# Patient Record
Sex: FEMALE | Race: OTHER | HISPANIC OR LATINO | URBAN - METROPOLITAN AREA
[De-identification: names, ages, dates, MRNs, and addresses within clinical notes are randomized per-mention and may not be internally consistent; named-entity substitution may affect disease eponyms.]

---

## 2017-05-28 ENCOUNTER — EMERGENCY (EMERGENCY)
Facility: HOSPITAL | Age: 42
LOS: 1 days | Discharge: HOME | End: 2017-05-28
Attending: EMERGENCY MEDICINE
Payer: COMMERCIAL

## 2017-05-28 VITALS
WEIGHT: 139.99 LBS | HEART RATE: 70 BPM | SYSTOLIC BLOOD PRESSURE: 108 MMHG | OXYGEN SATURATION: 100 % | DIASTOLIC BLOOD PRESSURE: 60 MMHG | TEMPERATURE: 98 F | RESPIRATION RATE: 20 BRPM

## 2017-05-28 LAB — HCG SERPL-ACNC: <2 MIU/ML — SIGNIFICANT CHANGE UP

## 2017-05-28 PROCEDURE — 99284 EMERGENCY DEPT VISIT MOD MDM: CPT

## 2017-05-28 PROCEDURE — 84702 CHORIONIC GONADOTROPIN TEST: CPT

## 2017-05-28 PROCEDURE — 96375 TX/PRO/DX INJ NEW DRUG ADDON: CPT

## 2017-05-28 PROCEDURE — 99284 EMERGENCY DEPT VISIT MOD MDM: CPT | Mod: 25

## 2017-05-28 PROCEDURE — 96374 THER/PROPH/DIAG INJ IV PUSH: CPT

## 2017-05-28 RX ORDER — METHOCARBAMOL 500 MG/1
2 TABLET, FILM COATED ORAL
Qty: 21 | Refills: 0
Start: 2017-05-28 | End: 2017-06-02

## 2017-05-28 RX ORDER — METHOCARBAMOL 500 MG/1
2 TABLET, FILM COATED ORAL
Qty: 21 | Refills: 0 | OUTPATIENT
Start: 2017-05-28 | End: 2017-06-02

## 2017-05-28 RX ORDER — SODIUM CHLORIDE 9 MG/ML
3 INJECTION INTRAMUSCULAR; INTRAVENOUS; SUBCUTANEOUS EVERY 8 HOURS
Qty: 0 | Refills: 0 | Status: DISCONTINUED | OUTPATIENT
Start: 2017-05-28 | End: 2017-06-01

## 2017-05-28 RX ORDER — METOCLOPRAMIDE HCL 10 MG
10 TABLET ORAL ONCE
Qty: 0 | Refills: 0 | Status: DISCONTINUED | OUTPATIENT
Start: 2017-05-28 | End: 2017-05-28

## 2017-05-28 RX ORDER — IBUPROFEN 200 MG
1 TABLET ORAL
Qty: 20 | Refills: 0 | OUTPATIENT
Start: 2017-05-28 | End: 2017-06-02

## 2017-05-28 RX ORDER — IBUPROFEN 200 MG
1 TABLET ORAL
Qty: 20 | Refills: 0
Start: 2017-05-28 | End: 2017-06-02

## 2017-05-28 RX ORDER — KETOROLAC TROMETHAMINE 30 MG/ML
30 SYRINGE (ML) INJECTION ONCE
Qty: 0 | Refills: 0 | Status: DISCONTINUED | OUTPATIENT
Start: 2017-05-28 | End: 2017-05-28

## 2017-05-28 RX ADMIN — Medication 30 MILLIGRAM(S): at 19:51

## 2017-05-28 RX ADMIN — SODIUM CHLORIDE 3 MILLILITER(S): 9 INJECTION INTRAMUSCULAR; INTRAVENOUS; SUBCUTANEOUS at 19:51

## 2017-05-28 RX ADMIN — Medication 30 MILLIGRAM(S): at 19:49

## 2017-05-28 NOTE — ED STATDOCS - PROGRESS NOTE DETAILS
HPI, ROS, PE done by intake doc. Orders/Plan reviewed. Pt presents today with neck stiffness x 2-3 days. Pt denies falls, injury, rash, fever, chills, nausea, vomiting, dizziness, confusion, headache (read and acknowledged triage note-Pt denies headache). She was sick about 1 month ago, but no recent uri symptoms. PE- Well developed, well-nourish, resting comfortably in NAD. EENT: no facial droop, no hemotympanum, uvula midline, tongue midline, PERRL, EOM b/l, Cardiac- +S1, S2, without murmurs, rubs, or gallops. Pulm- lungs CTA without wheezes, ronchi, or rales. Abdomen- BS normoactive. Soft, nontender to palpation. MS: ttp b/l paraspinal muscles, no midline tenderness, +spasm, b/l. Pt unable to move neck secondary to pain. FROM/strength/sensation of upper extremities. Neuro: intact without focal deficits, A&Ox3, no gross sensory or motor deficits. Plan: Will f/u plan per intake physician and re-eval Pt feeling a little better. Able to range cervical spine in all directions now, although limited. Will rx home with robaxin and motrin. Counseled on medication use. Given spine center f/u HPI, ROS, PE done by intake doc. Orders/Plan reviewed. Pt presents today with neck stiffness x 2-3 days. Admits to radicular symptoms to left arm. Pt states left neck pain > right neck pain. Pt denies falls, injury, rash, fever, chills, nausea, vomiting, dizziness, confusion, headache (read and acknowledged triage note-Pt denies headache). She was sick about 1 month ago, but no recent uri symptoms. Pt had similar episode in November 2016. PE- Well developed, well-nourish, resting comfortably in NAD. EENT: no facial droop, no hemotympanum, uvula midline, tongue midline, PERRL, EOM b/l, Cardiac- +S1, S2, without murmurs, rubs, or gallops. Pulm- lungs CTA without wheezes, ronchi, or rales. Abdomen- BS normoactive. Soft, nontender to palpation. MS: ttp b/l paraspinal muscles, no midline tenderness, +spasms to left traps and paraspinal muscles < right paraspinal muslces, b/l. Pt unable to move neck secondary to pain. FROM/strength/sensation of upper extremities. Neuro: intact without focal deficits, A&Ox3, no gross sensory or motor deficits. Plan: Will f/u plan per intake physician and re-eval HPI, ROS, PE done by intake doc. Orders/Plan reviewed. Pt presents today with neck stiffness x 2-3 days. Admits to radicular symptoms to left arm. Pt states left neck pain > right neck pain. Pt denies falls, injury, rash, fever, chills, nausea, vomiting, dizziness, confusion, headache (read and acknowledged triage note-Pt denies headache). She was sick about 1 month ago, but no recent uri symptoms. Pt had similar episode in November 2016. Pt denies chance of pregnancy, as she had a hysterectomy. PE- Well developed, well-nourish, resting comfortably in NAD. EENT: no facial droop, no hemotympanum, uvula midline, tongue midline, PERRL, EOM b/l, Cardiac- +S1, S2, without murmurs, rubs, or gallops. Pulm- lungs CTA without wheezes, ronchi, or rales. Abdomen- BS normoactive. Soft, nontender to palpation. MS: ttp b/l paraspinal muscles, no midline tenderness, +spasms to left traps and paraspinal muscles < right paraspinal muslces, b/l. Pt unable to move neck secondary to pain. FROM/strength/sensation of upper extremities. Neuro: intact without focal deficits, A&Ox3, no gross sensory or motor deficits. Plan: Will f/u plan per intake physician and re-eval

## 2017-05-28 NOTE — ED ADULT TRIAGE NOTE - CHIEF COMPLAINT QUOTE
lt side neck stiffness and body pain x 2 days.  Has migraine headache HX. took Motrin 600mg 9am. no fever now.

## 2017-05-28 NOTE — ED STATDOCS - NS ED MD SCRIBE ATTENDING SCRIBE SECTIONS
VITAL SIGNS( Pullset)/HIV/DISPOSITION/INTAKE ASSESSMENT/SCREENINGS/PAST MEDICAL/SURGICAL/SOCIAL HISTORY/PHYSICAL EXAM/REVIEW OF SYSTEMS/HISTORY OF PRESENT ILLNESS

## 2017-05-28 NOTE — ED STATDOCS - OBJECTIVE STATEMENT
40 y/o female presents to ED c/o neck pain and stiffness x 2 days. Unable to move her neck secondary to stiffness. No obvious trauma. Denies smoking or EOTH use. Pt had a similar episode about 1 month ago; previously when she would move her neck it would crack but this time pt cannot move neck at all. When she moves left or right she feels like her arm goes numb. Denies fever, chills, N/V/D, weakness, cough, hematuria. NKDA.

## 2017-05-28 NOTE — ED STATDOCS - ATTENDING CONTRIBUTION TO CARE
I, Benjamin Zhu, performed the initial face to face bedside interview with this patient regarding history of present illness, review of symptoms and relevant past medical, social and family history.  I completed an independent physical examination.  I was the initial provider who evaluated this patient. I have signed out the follow up of any pending tests (i.e. labs, radiological studies) to the ACP.  I have communicated the patient’s plan of care and disposition with the ACP.  The history, relevant review of systems, past medical and surgical history, medical decision making, and physical examination was documented by the scribe in my presence and I attest to the accuracy of the documentation.

## 2018-01-19 ENCOUNTER — EMERGENCY (EMERGENCY)
Facility: HOSPITAL | Age: 43
LOS: 1 days | Discharge: HOME | End: 2018-01-19
Attending: EMERGENCY MEDICINE
Payer: COMMERCIAL

## 2018-01-19 VITALS
TEMPERATURE: 98 F | HEIGHT: 65 IN | DIASTOLIC BLOOD PRESSURE: 69 MMHG | SYSTOLIC BLOOD PRESSURE: 105 MMHG | HEART RATE: 79 BPM | OXYGEN SATURATION: 99 % | RESPIRATION RATE: 20 BRPM | WEIGHT: 179.02 LBS

## 2018-01-19 PROCEDURE — 99284 EMERGENCY DEPT VISIT MOD MDM: CPT
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
## 2018-01-20 PROCEDURE — 99284 EMERGENCY DEPT VISIT MOD MDM: CPT | Mod: 25

## 2018-01-20 PROCEDURE — 72125 CT NECK SPINE W/O DYE: CPT | Mod: 26

## 2018-01-20 PROCEDURE — 73030 X-RAY EXAM OF SHOULDER: CPT | Mod: 26,LT

## 2018-01-20 PROCEDURE — 73030 X-RAY EXAM OF SHOULDER: CPT

## 2018-01-20 PROCEDURE — 72125 CT NECK SPINE W/O DYE: CPT

## 2018-01-20 RX ORDER — METHOCARBAMOL 500 MG/1
2 TABLET, FILM COATED ORAL
Qty: 80 | Refills: 0
Start: 2018-01-20 | End: 2018-01-29

## 2018-01-20 RX ORDER — METHOCARBAMOL 500 MG/1
1000 TABLET, FILM COATED ORAL ONCE
Qty: 0 | Refills: 0 | Status: COMPLETED | OUTPATIENT
Start: 2018-01-20 | End: 2018-01-20

## 2018-01-20 RX ORDER — IBUPROFEN 200 MG
1 TABLET ORAL
Qty: 20 | Refills: 0
Start: 2018-01-20 | End: 2018-01-24

## 2018-01-20 RX ORDER — IBUPROFEN 200 MG
600 TABLET ORAL ONCE
Qty: 0 | Refills: 0 | Status: COMPLETED | OUTPATIENT
Start: 2018-01-20 | End: 2018-01-20

## 2018-01-20 RX ADMIN — METHOCARBAMOL 1000 MILLIGRAM(S): 500 TABLET, FILM COATED ORAL at 00:54
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
## 2018-05-21 ENCOUNTER — EMERGENCY (EMERGENCY)
Facility: HOSPITAL | Age: 43
LOS: 1 days | Discharge: HOME | End: 2018-05-21
Attending: EMERGENCY MEDICINE
Payer: COMMERCIAL

## 2018-05-21 VITALS — WEIGHT: 179.02 LBS | HEIGHT: 64 IN

## 2018-05-21 VITALS
SYSTOLIC BLOOD PRESSURE: 118 MMHG | DIASTOLIC BLOOD PRESSURE: 82 MMHG | OXYGEN SATURATION: 99 % | RESPIRATION RATE: 20 BRPM | TEMPERATURE: 98 F | HEART RATE: 111 BPM

## 2018-05-21 PROCEDURE — 99283 EMERGENCY DEPT VISIT LOW MDM: CPT

## 2018-05-21 PROCEDURE — 99284 EMERGENCY DEPT VISIT MOD MDM: CPT

## 2018-05-21 RX ORDER — DIPHENHYDRAMINE HCL 50 MG
1 CAPSULE ORAL
Qty: 15 | Refills: 0
Start: 2018-05-21 | End: 2018-05-25

## 2018-05-21 RX ORDER — FAMOTIDINE 10 MG/ML
20 INJECTION INTRAVENOUS DAILY
Qty: 0 | Refills: 0 | Status: DISCONTINUED | OUTPATIENT
Start: 2018-05-21 | End: 2018-05-26

## 2018-05-21 RX ORDER — FAMOTIDINE 10 MG/ML
1 INJECTION INTRAVENOUS
Qty: 14 | Refills: 0
Start: 2018-05-21 | End: 2018-06-03

## 2018-05-21 RX ORDER — DIPHENHYDRAMINE HCL 50 MG
50 CAPSULE ORAL ONCE
Qty: 0 | Refills: 0 | Status: COMPLETED | OUTPATIENT
Start: 2018-05-21 | End: 2018-05-21

## 2018-05-21 RX ADMIN — Medication 50 MILLIGRAM(S): at 09:06

## 2018-05-21 RX ADMIN — FAMOTIDINE 20 MILLIGRAM(S): 10 INJECTION INTRAVENOUS at 09:06

## 2018-05-21 NOTE — ED PROVIDER NOTE - OBJECTIVE STATEMENT
pt comes in c/o diffuse rash and redness on hands arm and thorax , puritic   just finished abx for UTI

## 2018-05-21 NOTE — ED ADULT TRIAGE NOTE - CHIEF COMPLAINT QUOTE
pt states that she thinks she is having an allergic reaction to something since yesterday. pt c/o itching to body with some sob this am. no audible wheezes heard resp even and unlabored

## 2018-05-28 ENCOUNTER — EMERGENCY (EMERGENCY)
Facility: HOSPITAL | Age: 43
LOS: 1 days | Discharge: HOME | End: 2018-05-28
Attending: EMERGENCY MEDICINE
Payer: COMMERCIAL

## 2018-05-28 VITALS
HEART RATE: 108 BPM | RESPIRATION RATE: 15 BRPM | OXYGEN SATURATION: 99 % | SYSTOLIC BLOOD PRESSURE: 148 MMHG | TEMPERATURE: 98 F | DIASTOLIC BLOOD PRESSURE: 81 MMHG

## 2018-05-28 PROCEDURE — 73030 X-RAY EXAM OF SHOULDER: CPT | Mod: 26,LT

## 2018-05-28 PROCEDURE — 99283 EMERGENCY DEPT VISIT LOW MDM: CPT

## 2018-05-28 PROCEDURE — 73030 X-RAY EXAM OF SHOULDER: CPT

## 2018-05-28 NOTE — ED PROVIDER NOTE - PLAN OF CARE
Follow up with your Primary Care Physician within the next 2-3 days  You may take Ibuprofen 600mg every 8 hours as needed for pain  Take Percocet 5/325mg  1 tablet every 6 hrs as needed for severe pain. Do not drive or drink alcohol while taking this medications.   Keep shoulder sling in place for comfort   Attempt to move the arm for a few minutes at least 4 times per day to prevent the shoulder from becoming stiff  Follow up with Orthopedics within the next 3 days (List provided)  Continue your current medication regimen  Return to the Emergency Room if you experience new or worsening symptoms

## 2018-05-28 NOTE — ED PROVIDER NOTE - ATTENDING CONTRIBUTION TO CARE
43yo F with L shoulder pain s/p fall a few days ago.  No other injury.  L shoulder ttp with limited rom due to pain, strong radial pulse.  No clavicle ttp.  No C/T/LS ttp.  Pain control, xray, sling, ortho f/u outpatient.

## 2018-05-28 NOTE — ED PROVIDER NOTE - OBJECTIVE STATEMENT
42 [place] PMHx [place] allergic reaction 4 days ago which was progressively worsening for 10 days. She does not recall the fall, she explains, due to the allergic reaction. She was hospitalized and sent home with Benadryl, Prednisone and Pepcid which she has been compliant on. She has had worsening left shoulder pain since she was sent home. The pain is exacerbated by moving the left arm. She has limited movement secondary to the pain. The shoulder was not imaged during her hospitalization because she did not have pain there at first. She took advil for the first time since the pain began about 1 hour prior to arrival to ED today with no significant relief.

## 2018-05-28 NOTE — ED PROVIDER NOTE - CARE PLAN
Principal Discharge DX:	Shoulder injury, initial encounter  Assessment and plan of treatment:	Follow up with your Primary Care Physician within the next 2-3 days  You may take Ibuprofen 600mg every 8 hours as needed for pain  Take Percocet 5/325mg  1 tablet every 6 hrs as needed for severe pain. Do not drive or drink alcohol while taking this medications.   Keep shoulder sling in place for comfort   Attempt to move the arm for a few minutes at least 4 times per day to prevent the shoulder from becoming stiff  Follow up with Orthopedics within the next 3 days (List provided)  Continue your current medication regimen  Return to the Emergency Room if you experience new or worsening symptoms

## 2018-05-28 NOTE — ED ADULT TRIAGE NOTE - CHIEF COMPLAINT QUOTE
pt was admitted for anaphylactic reaction in [facility]  stayed 2 days d/c Saturday pt stated left upper shoulder pain form the fall form the allergic reaction

## 2018-05-30 ENCOUNTER — EMERGENCY (EMERGENCY)
Facility: HOSPITAL | Age: 43
LOS: 1 days | Discharge: HOME | End: 2018-05-30
Attending: EMERGENCY MEDICINE
Payer: COMMERCIAL

## 2018-05-30 VITALS
DIASTOLIC BLOOD PRESSURE: 82 MMHG | SYSTOLIC BLOOD PRESSURE: 133 MMHG | HEART RATE: 98 BPM | TEMPERATURE: 98 F | HEIGHT: 66 IN | WEIGHT: 149.91 LBS | RESPIRATION RATE: 18 BRPM | OXYGEN SATURATION: 99 %

## 2018-05-30 VITALS
HEART RATE: 87 BPM | SYSTOLIC BLOOD PRESSURE: 128 MMHG | OXYGEN SATURATION: 100 % | RESPIRATION RATE: 16 BRPM | DIASTOLIC BLOOD PRESSURE: 72 MMHG

## 2018-05-30 LAB
ALBUMIN SERPL ELPH-MCNC: 3.7 G/DL — SIGNIFICANT CHANGE UP (ref 3.3–5.2)
ALP SERPL-CCNC: 97 U/L — SIGNIFICANT CHANGE UP (ref 40–120)
ALT FLD-CCNC: 45 U/L — HIGH
ANION GAP SERPL CALC-SCNC: 12 MMOL/L — SIGNIFICANT CHANGE UP (ref 5–17)
APPEARANCE UR: CLEAR — SIGNIFICANT CHANGE UP
AST SERPL-CCNC: 20 U/L — SIGNIFICANT CHANGE UP
BASOPHILS # BLD AUTO: 0 K/UL — SIGNIFICANT CHANGE UP (ref 0–0.2)
BASOPHILS NFR BLD AUTO: 0.1 % — SIGNIFICANT CHANGE UP (ref 0–2)
BILIRUB SERPL-MCNC: 0.2 MG/DL — LOW (ref 0.4–2)
BILIRUB UR-MCNC: NEGATIVE — SIGNIFICANT CHANGE UP
BUN SERPL-MCNC: 10 MG/DL — SIGNIFICANT CHANGE UP (ref 8–20)
CALCIUM SERPL-MCNC: 8.6 MG/DL — SIGNIFICANT CHANGE UP (ref 8.6–10.2)
CHLORIDE SERPL-SCNC: 96 MMOL/L — LOW (ref 98–107)
CK SERPL-CCNC: 56 U/L — SIGNIFICANT CHANGE UP (ref 25–170)
CO2 SERPL-SCNC: 29 MMOL/L — SIGNIFICANT CHANGE UP (ref 22–29)
COLOR SPEC: YELLOW — SIGNIFICANT CHANGE UP
CREAT SERPL-MCNC: 0.65 MG/DL — SIGNIFICANT CHANGE UP (ref 0.5–1.3)
CRP SERPL-MCNC: 5.1 MG/DL — HIGH (ref 0–0.4)
DIFF PNL FLD: NEGATIVE — SIGNIFICANT CHANGE UP
EOSINOPHIL # BLD AUTO: 0.3 K/UL — SIGNIFICANT CHANGE UP (ref 0–0.5)
EOSINOPHIL NFR BLD AUTO: 2.1 % — SIGNIFICANT CHANGE UP (ref 0–6)
EPI CELLS # UR: SIGNIFICANT CHANGE UP
ERYTHROCYTE [SEDIMENTATION RATE] IN BLOOD: 30 MM/HR — HIGH (ref 0–20)
GLUCOSE SERPL-MCNC: 105 MG/DL — SIGNIFICANT CHANGE UP (ref 70–115)
GLUCOSE UR QL: NEGATIVE MG/DL — SIGNIFICANT CHANGE UP
HCG UR QL: NEGATIVE — SIGNIFICANT CHANGE UP
HCT VFR BLD CALC: 41.1 % — SIGNIFICANT CHANGE UP (ref 37–47)
HGB BLD-MCNC: 13.1 G/DL — SIGNIFICANT CHANGE UP (ref 12–16)
KETONES UR-MCNC: NEGATIVE — SIGNIFICANT CHANGE UP
LEUKOCYTE ESTERASE UR-ACNC: NEGATIVE — SIGNIFICANT CHANGE UP
LYMPHOCYTES # BLD AUTO: 1.4 K/UL — SIGNIFICANT CHANGE UP (ref 1–4.8)
LYMPHOCYTES # BLD AUTO: 8.9 % — LOW (ref 20–55)
MCHC RBC-ENTMCNC: 29.4 PG — SIGNIFICANT CHANGE UP (ref 27–31)
MCHC RBC-ENTMCNC: 31.9 G/DL — LOW (ref 32–36)
MCV RBC AUTO: 92.2 FL — SIGNIFICANT CHANGE UP (ref 81–99)
MONOCYTES # BLD AUTO: 1.3 K/UL — HIGH (ref 0–0.8)
MONOCYTES NFR BLD AUTO: 8.2 % — SIGNIFICANT CHANGE UP (ref 3–10)
NEUTROPHILS # BLD AUTO: 12.3 K/UL — HIGH (ref 1.8–8)
NEUTROPHILS NFR BLD AUTO: 79.6 % — HIGH (ref 37–73)
NITRITE UR-MCNC: NEGATIVE — SIGNIFICANT CHANGE UP
PH UR: 8 — SIGNIFICANT CHANGE UP (ref 5–8)
PLATELET # BLD AUTO: 235 K/UL — SIGNIFICANT CHANGE UP (ref 150–400)
POTASSIUM SERPL-MCNC: 4.3 MMOL/L — SIGNIFICANT CHANGE UP (ref 3.5–5.3)
POTASSIUM SERPL-SCNC: 4.3 MMOL/L — SIGNIFICANT CHANGE UP (ref 3.5–5.3)
PROT SERPL-MCNC: 6.8 G/DL — SIGNIFICANT CHANGE UP (ref 6.6–8.7)
PROT UR-MCNC: NEGATIVE MG/DL — SIGNIFICANT CHANGE UP
RBC # BLD: 4.46 M/UL — SIGNIFICANT CHANGE UP (ref 4.4–5.2)
RBC # FLD: 13.6 % — SIGNIFICANT CHANGE UP (ref 11–15.6)
RBC CASTS # UR COMP ASSIST: SIGNIFICANT CHANGE UP /HPF (ref 0–4)
SODIUM SERPL-SCNC: 137 MMOL/L — SIGNIFICANT CHANGE UP (ref 135–145)
SP GR SPEC: 1.01 — SIGNIFICANT CHANGE UP (ref 1.01–1.02)
T4 AB SER-ACNC: 7.7 UG/DL — SIGNIFICANT CHANGE UP (ref 4.5–12)
TROPONIN T SERPL-MCNC: <0.01 NG/ML — SIGNIFICANT CHANGE UP (ref 0–0.06)
TSH SERPL-MCNC: 2.03 UIU/ML — SIGNIFICANT CHANGE UP (ref 0.27–4.2)
UROBILINOGEN FLD QL: NEGATIVE MG/DL — SIGNIFICANT CHANGE UP
WBC # BLD: 15.5 K/UL — HIGH (ref 4.8–10.8)
WBC # FLD AUTO: 15.5 K/UL — HIGH (ref 4.8–10.8)

## 2018-05-30 PROCEDURE — 84484 ASSAY OF TROPONIN QUANT: CPT

## 2018-05-30 PROCEDURE — 81025 URINE PREGNANCY TEST: CPT

## 2018-05-30 PROCEDURE — 96374 THER/PROPH/DIAG INJ IV PUSH: CPT

## 2018-05-30 PROCEDURE — 96375 TX/PRO/DX INJ NEW DRUG ADDON: CPT

## 2018-05-30 PROCEDURE — 99284 EMERGENCY DEPT VISIT MOD MDM: CPT

## 2018-05-30 PROCEDURE — 36415 COLL VENOUS BLD VENIPUNCTURE: CPT

## 2018-05-30 PROCEDURE — 81001 URINALYSIS AUTO W/SCOPE: CPT

## 2018-05-30 PROCEDURE — 99284 EMERGENCY DEPT VISIT MOD MDM: CPT | Mod: 25

## 2018-05-30 PROCEDURE — 80053 COMPREHEN METABOLIC PANEL: CPT

## 2018-05-30 PROCEDURE — 85027 COMPLETE CBC AUTOMATED: CPT

## 2018-05-30 PROCEDURE — 84443 ASSAY THYROID STIM HORMONE: CPT

## 2018-05-30 PROCEDURE — 86140 C-REACTIVE PROTEIN: CPT

## 2018-05-30 PROCEDURE — 84436 ASSAY OF TOTAL THYROXINE: CPT

## 2018-05-30 PROCEDURE — 85652 RBC SED RATE AUTOMATED: CPT

## 2018-05-30 PROCEDURE — 96376 TX/PRO/DX INJ SAME DRUG ADON: CPT

## 2018-05-30 PROCEDURE — 82550 ASSAY OF CK (CPK): CPT

## 2018-05-30 RX ORDER — HYDROMORPHONE HYDROCHLORIDE 2 MG/ML
0.5 INJECTION INTRAMUSCULAR; INTRAVENOUS; SUBCUTANEOUS ONCE
Qty: 0 | Refills: 0 | Status: DISCONTINUED | OUTPATIENT
Start: 2018-05-30 | End: 2018-05-30

## 2018-05-30 RX ORDER — SODIUM CHLORIDE 9 MG/ML
3 INJECTION INTRAMUSCULAR; INTRAVENOUS; SUBCUTANEOUS ONCE
Qty: 0 | Refills: 0 | Status: COMPLETED | OUTPATIENT
Start: 2018-05-30 | End: 2018-05-30

## 2018-05-30 RX ORDER — KETOROLAC TROMETHAMINE 30 MG/ML
30 SYRINGE (ML) INJECTION ONCE
Qty: 0 | Refills: 0 | Status: DISCONTINUED | OUTPATIENT
Start: 2018-05-30 | End: 2018-05-30

## 2018-05-30 RX ADMIN — HYDROMORPHONE HYDROCHLORIDE 0.5 MILLIGRAM(S): 2 INJECTION INTRAMUSCULAR; INTRAVENOUS; SUBCUTANEOUS at 14:37

## 2018-05-30 RX ADMIN — HYDROMORPHONE HYDROCHLORIDE 0.5 MILLIGRAM(S): 2 INJECTION INTRAMUSCULAR; INTRAVENOUS; SUBCUTANEOUS at 11:21

## 2018-05-30 RX ADMIN — Medication 30 MILLIGRAM(S): at 11:18

## 2018-05-30 RX ADMIN — SODIUM CHLORIDE 3 MILLILITER(S): 9 INJECTION INTRAMUSCULAR; INTRAVENOUS; SUBCUTANEOUS at 09:00

## 2018-05-30 RX ADMIN — Medication 30 MILLIGRAM(S): at 09:00

## 2018-05-30 NOTE — ED PROVIDER NOTE - ENMT, MLM
Airway patent, Nasal mucosa clear. Mouth with normal mucosa. Throat has no vesicles, no oropharyngeal exudates and uvula is midline. pain on moving jaw

## 2018-05-30 NOTE — ED ADULT NURSE NOTE - CADM POA CENTRAL LINE
Meaghan calling back from [facility] ([place], [place]) [phone]     Prabha Boyd  Specialty CSS     No

## 2018-05-30 NOTE — ED PROVIDER NOTE - PROGRESS NOTE DETAILS
patient in pain given dilaudid for pain I believeshe has a reumatological condition . Dr. Carvalho paged c-reactive protein elevated patient again in intractable pain no clear diagnosis already on steroids patient states percocet not helping has been to 3 medical facilities in last 3 weeks. It will be difficult to wo workup as an outpatient because of the severity of pain and stiffness will consider admission. patient again in pain no clear diagnosis already on steroids patient states percocet not helping has been to 3 medical facilities in last 3 weeks. spoke to Dr. Constantino. By admitting her there will be little accomplished rheumatologic testing will take one week to ocmplete and there is nothing further a stay will accomplish. Given 1one dose of dilaudid

## 2018-05-30 NOTE — ED ADULT NURSE NOTE - OBJECTIVE STATEMENT
pt presents to ED with body aches and stiffness to muscles since last night. pt denies recent falls. will continue to monitor and reassess

## 2018-05-30 NOTE — ED PROVIDER NOTE - OBJECTIVE STATEMENT
42 you c/o diffuse joint pain and muscle pain has difficulty moving her jaw although able to speak 2 weekds ago developed urticaria and was treated for this 1 week ago treated for shoulder pain has been to [facility] also unknown what was done was given oxycodone for this problem which has provided no relief no rash now no hx of arthritis no smoking no drinking

## 2018-05-30 NOTE — ED ADULT NURSE REASSESSMENT NOTE - NS ED NURSE REASSESS COMMENT FT1
Pt medicated for pain with Dilaudid and is now resting comfortably. Pt will be d/c to follow up with rheumatology.

## 2018-05-30 NOTE — ED ADULT NURSE REASSESSMENT NOTE - NS ED NURSE REASSESS COMMENT FT1
Pt feeling better after dose of Dilaudid. Did not want the 2nd dose that was ordered. Will be d/c and follow up as directed.

## 2018-05-31 PROBLEM — Z00.00 ENCOUNTER FOR PREVENTIVE HEALTH EXAMINATION: Status: ACTIVE | Noted: 2018-05-31

## 2018-06-14 ENCOUNTER — FORM ENCOUNTER (OUTPATIENT)
Age: 43
End: 2018-06-14

## 2018-06-15 ENCOUNTER — APPOINTMENT (OUTPATIENT)
Dept: RHEUMATOLOGY | Facility: CLINIC | Age: 43
End: 2018-06-15
Payer: COMMERCIAL

## 2018-06-15 ENCOUNTER — OUTPATIENT (OUTPATIENT)
Dept: OUTPATIENT SERVICES | Facility: HOSPITAL | Age: 43
LOS: 1 days | Discharge: HOME | End: 2018-06-15
Payer: COMMERCIAL

## 2018-06-15 VITALS
SYSTOLIC BLOOD PRESSURE: 100 MMHG | HEIGHT: 63 IN | DIASTOLIC BLOOD PRESSURE: 70 MMHG | BODY MASS INDEX: 32.78 KG/M2 | OXYGEN SATURATION: 97 % | WEIGHT: 185 LBS | HEART RATE: 83 BPM

## 2018-06-15 DIAGNOSIS — Z82.61 FAMILY HISTORY OF ARTHRITIS: ICD-10-CM

## 2018-06-15 DIAGNOSIS — Z82.69 FAMILY HISTORY OF OTHER DISEASES OF THE MUSCULOSKELETAL SYSTEM AND CONNECTIVE TISSUE: ICD-10-CM

## 2018-06-15 DIAGNOSIS — M79.1 MYALGIA: ICD-10-CM

## 2018-06-15 DIAGNOSIS — Z87.898 PERSONAL HISTORY OF OTHER SPECIFIED CONDITIONS: ICD-10-CM

## 2018-06-15 DIAGNOSIS — M13.0 POLYARTHRITIS, UNSPECIFIED: ICD-10-CM

## 2018-06-15 DIAGNOSIS — Z87.891 PERSONAL HISTORY OF NICOTINE DEPENDENCE: ICD-10-CM

## 2018-06-15 PROCEDURE — 73130 X-RAY EXAM OF HAND: CPT | Mod: 26,50

## 2018-06-15 PROCEDURE — 73630 X-RAY EXAM OF FOOT: CPT | Mod: 26,50

## 2018-06-15 PROCEDURE — 73030 X-RAY EXAM OF SHOULDER: CPT | Mod: 26,50

## 2018-06-15 PROCEDURE — 99245 OFF/OP CONSLTJ NEW/EST HI 55: CPT | Mod: 25

## 2018-06-15 PROCEDURE — 36415 COLL VENOUS BLD VENIPUNCTURE: CPT

## 2018-06-15 RX ORDER — PREDNISONE 50 MG/1
50 TABLET ORAL
Qty: 3 | Refills: 0 | Status: COMPLETED | COMMUNITY
Start: 2018-05-21

## 2018-06-15 RX ORDER — AMOXICILLIN 875 MG/1
875 TABLET, FILM COATED ORAL
Qty: 20 | Refills: 0 | Status: COMPLETED | COMMUNITY
Start: 2018-04-27

## 2018-06-15 RX ORDER — IBUPROFEN 600 MG/1
600 TABLET, FILM COATED ORAL
Qty: 20 | Refills: 0 | Status: ACTIVE | COMMUNITY
Start: 2018-01-20

## 2018-06-15 RX ORDER — FLUCONAZOLE 150 MG/1
150 TABLET ORAL
Qty: 1 | Refills: 0 | Status: COMPLETED | COMMUNITY
Start: 2018-03-29

## 2018-06-15 RX ORDER — EPINEPHRINE 0.3 MG/.3ML
0.3 INJECTION INTRAMUSCULAR
Qty: 2 | Refills: 0 | Status: ACTIVE | COMMUNITY
Start: 2018-05-26

## 2018-06-15 RX ORDER — METHOCARBAMOL 500 MG/1
500 TABLET, FILM COATED ORAL
Qty: 80 | Refills: 0 | Status: COMPLETED | COMMUNITY
Start: 2018-01-20

## 2018-06-15 RX ORDER — FAMOTIDINE 20 MG/1
20 TABLET, FILM COATED ORAL
Qty: 14 | Refills: 0 | Status: COMPLETED | COMMUNITY
Start: 2018-05-21

## 2018-06-15 RX ORDER — NITROFURANTOIN (MONOHYDRATE/MACROCRYSTALS) 25; 75 MG/1; MG/1
100 CAPSULE ORAL
Qty: 14 | Refills: 0 | Status: COMPLETED | COMMUNITY
Start: 2018-05-09

## 2018-06-15 RX ORDER — METRONIDAZOLE 7.5 MG/G
0.75 GEL VAGINAL
Qty: 70 | Refills: 0 | Status: COMPLETED | COMMUNITY
Start: 2018-03-29

## 2018-06-15 RX ORDER — NAPROXEN 500 MG/1
500 TABLET ORAL
Qty: 28 | Refills: 0 | Status: COMPLETED | COMMUNITY
Start: 2018-04-27

## 2018-06-15 RX ORDER — PREDNISONE 20 MG/1
20 TABLET ORAL
Qty: 4 | Refills: 0 | Status: COMPLETED | COMMUNITY
Start: 2018-05-22

## 2018-06-15 RX ORDER — OXYCODONE AND ACETAMINOPHEN 5; 325 MG/1; MG/1
5-325 TABLET ORAL
Qty: 12 | Refills: 0 | Status: COMPLETED | COMMUNITY
Start: 2018-05-28

## 2018-06-18 LAB
ALBUMIN SERPL ELPH-MCNC: 4.1 G/DL
ALP BLD-CCNC: 100 U/L
ALT SERPL-CCNC: 17 U/L
ANION GAP SERPL CALC-SCNC: 14 MMOL/L
AST SERPL-CCNC: 18 U/L
BASOPHILS # BLD AUTO: 0.01 K/UL
BASOPHILS NFR BLD AUTO: 0.1 %
BILIRUB SERPL-MCNC: 0.2 MG/DL
BUN SERPL-MCNC: 12 MG/DL
CALCIUM SERPL-MCNC: 9.3 MG/DL
CCP AB SER IA-ACNC: <8 UNITS
CHLORIDE SERPL-SCNC: 102 MMOL/L
CK SERPL-CCNC: 48 U/L
CO2 SERPL-SCNC: 26 MMOL/L
CREAT SERPL-MCNC: 0.82 MG/DL
CRP SERPL-MCNC: 0.6 MG/DL
ENA SS-A AB SER IA-ACNC: <0.2 AL
ENA SS-B AB SER IA-ACNC: <0.2 AL
EOSINOPHIL # BLD AUTO: 0.25 K/UL
EOSINOPHIL NFR BLD AUTO: 3.7 %
ERYTHROCYTE [SEDIMENTATION RATE] IN BLOOD BY WESTERGREN METHOD: 35 MM/HR
GLUCOSE SERPL-MCNC: 96 MG/DL
HCT VFR BLD CALC: 42.8 %
HGB BLD-MCNC: 13.4 G/DL
IMM GRANULOCYTES NFR BLD AUTO: 0.1 %
LYMPHOCYTES # BLD AUTO: 0.9 K/UL
LYMPHOCYTES NFR BLD AUTO: 13.4 %
MAN DIFF?: NORMAL
MCHC RBC-ENTMCNC: 29.8 PG
MCHC RBC-ENTMCNC: 31.3 GM/DL
MCV RBC AUTO: 95.1 FL
MONOCYTES # BLD AUTO: 0.41 K/UL
MONOCYTES NFR BLD AUTO: 6.1 %
NEUTROPHILS # BLD AUTO: 5.15 K/UL
NEUTROPHILS NFR BLD AUTO: 76.6 %
PLATELET # BLD AUTO: 225 K/UL
POTASSIUM SERPL-SCNC: 4 MMOL/L
PROT SERPL-MCNC: 7.4 G/DL
RBC # BLD: 4.5 M/UL
RBC # FLD: 14 %
RF+CCP IGG SER-IMP: NEGATIVE
RHEUMATOID FACT SER QL: <7 IU/ML
SODIUM SERPL-SCNC: 142 MMOL/L
TSH SERPL-ACNC: 1.64 UIU/ML
WBC # FLD AUTO: 6.73 K/UL

## 2018-06-20 LAB — ANA SER IF-ACNC: NEGATIVE

## 2018-06-29 ENCOUNTER — APPOINTMENT (OUTPATIENT)
Dept: RHEUMATOLOGY | Facility: CLINIC | Age: 43
End: 2018-06-29
Payer: COMMERCIAL

## 2018-06-29 VITALS
BODY MASS INDEX: 33.13 KG/M2 | WEIGHT: 187 LBS | HEART RATE: 85 BPM | OXYGEN SATURATION: 98 % | SYSTOLIC BLOOD PRESSURE: 110 MMHG | DIASTOLIC BLOOD PRESSURE: 70 MMHG

## 2018-06-29 DIAGNOSIS — R52 PAIN, UNSPECIFIED: ICD-10-CM

## 2018-06-29 PROCEDURE — 99214 OFFICE O/P EST MOD 30 MIN: CPT

## 2018-06-29 RX ORDER — DULOXETINE HYDROCHLORIDE 30 MG/1
30 CAPSULE, DELAYED RELEASE PELLETS ORAL
Qty: 30 | Refills: 2 | Status: ACTIVE | COMMUNITY
Start: 2018-06-29 | End: 1900-01-01

## 2018-07-05 ENCOUNTER — APPOINTMENT (OUTPATIENT)
Dept: RHEUMATOLOGY | Facility: CLINIC | Age: 43
End: 2018-07-05

## 2018-08-06 ENCOUNTER — APPOINTMENT (OUTPATIENT)
Dept: RHEUMATOLOGY | Facility: CLINIC | Age: 43
End: 2018-08-06
Payer: COMMERCIAL

## 2018-08-06 DIAGNOSIS — R70.0 ELEVATED ERYTHROCYTE SEDIMENTATION RATE: ICD-10-CM

## 2018-08-06 DIAGNOSIS — M13.0 POLYARTHRITIS, UNSPECIFIED: ICD-10-CM

## 2018-08-06 DIAGNOSIS — M79.7 FIBROMYALGIA: ICD-10-CM

## 2018-08-06 PROBLEM — L50.9 URTICARIA, UNSPECIFIED: Chronic | Status: ACTIVE | Noted: 2018-05-30

## 2018-08-06 PROCEDURE — 99214 OFFICE O/P EST MOD 30 MIN: CPT

## 2018-08-06 RX ORDER — METHYLPREDNISOLONE 4 MG/1
4 TABLET ORAL
Qty: 1 | Refills: 1 | Status: COMPLETED | COMMUNITY
Start: 2018-06-15 | End: 2018-08-06

## 2018-08-06 RX ORDER — PREGABALIN 75 MG/1
75 CAPSULE ORAL
Qty: 60 | Refills: 2 | Status: ACTIVE | COMMUNITY
Start: 2018-08-06 | End: 1900-01-01

## 2018-09-20 ENCOUNTER — APPOINTMENT (OUTPATIENT)
Dept: RHEUMATOLOGY | Facility: CLINIC | Age: 43
End: 2018-09-20

## 2019-04-24 NOTE — ED PROVIDER NOTE - NS ED SCRIBE ACP NAME FT

## 2019-09-13 ENCOUNTER — EMERGENCY (EMERGENCY)
Facility: HOSPITAL | Age: 44
LOS: 1 days | Discharge: HOME | End: 2019-09-13
Attending: EMERGENCY MEDICINE
Payer: COMMERCIAL

## 2019-09-13 VITALS
SYSTOLIC BLOOD PRESSURE: 114 MMHG | HEIGHT: 65 IN | TEMPERATURE: 98 F | WEIGHT: 188.94 LBS | DIASTOLIC BLOOD PRESSURE: 72 MMHG | OXYGEN SATURATION: 99 % | RESPIRATION RATE: 18 BRPM | HEART RATE: 91 BPM

## 2019-09-13 DIAGNOSIS — Z90.710 ACQUIRED ABSENCE OF BOTH CERVIX AND UTERUS: Chronic | ICD-10-CM

## 2019-09-13 LAB
ALBUMIN SERPL ELPH-MCNC: 4.3 G/DL — SIGNIFICANT CHANGE UP (ref 3.3–5.2)
ALP SERPL-CCNC: 128 U/L — HIGH (ref 40–120)
ALT FLD-CCNC: 11 U/L — SIGNIFICANT CHANGE UP
ANION GAP SERPL CALC-SCNC: 12 MMOL/L — SIGNIFICANT CHANGE UP (ref 5–17)
APPEARANCE UR: CLEAR — SIGNIFICANT CHANGE UP
AST SERPL-CCNC: 17 U/L — SIGNIFICANT CHANGE UP
BACTERIA # UR AUTO: ABNORMAL
BASOPHILS # BLD AUTO: 0.03 K/UL — SIGNIFICANT CHANGE UP (ref 0–0.2)
BASOPHILS NFR BLD AUTO: 0.4 % — SIGNIFICANT CHANGE UP (ref 0–2)
BILIRUB SERPL-MCNC: 0.2 MG/DL — LOW (ref 0.4–2)
BILIRUB UR-MCNC: NEGATIVE — SIGNIFICANT CHANGE UP
BUN SERPL-MCNC: 15 MG/DL — SIGNIFICANT CHANGE UP (ref 8–20)
CALCIUM SERPL-MCNC: 9.2 MG/DL — SIGNIFICANT CHANGE UP (ref 8.6–10.2)
CHLORIDE SERPL-SCNC: 102 MMOL/L — SIGNIFICANT CHANGE UP (ref 98–107)
CO2 SERPL-SCNC: 28 MMOL/L — SIGNIFICANT CHANGE UP (ref 22–29)
COLOR SPEC: YELLOW — SIGNIFICANT CHANGE UP
CREAT SERPL-MCNC: 0.71 MG/DL — SIGNIFICANT CHANGE UP (ref 0.5–1.3)
DIFF PNL FLD: ABNORMAL
EOSINOPHIL # BLD AUTO: 0.15 K/UL — SIGNIFICANT CHANGE UP (ref 0–0.5)
EOSINOPHIL NFR BLD AUTO: 1.8 % — SIGNIFICANT CHANGE UP (ref 0–6)
EPI CELLS # UR: ABNORMAL
GLUCOSE SERPL-MCNC: 97 MG/DL — SIGNIFICANT CHANGE UP (ref 70–115)
GLUCOSE UR QL: NEGATIVE MG/DL — SIGNIFICANT CHANGE UP
HCG UR QL: NEGATIVE — SIGNIFICANT CHANGE UP
HCT VFR BLD CALC: 44.3 % — SIGNIFICANT CHANGE UP (ref 34.5–45)
HGB BLD-MCNC: 14.1 G/DL — SIGNIFICANT CHANGE UP (ref 11.5–15.5)
IMM GRANULOCYTES NFR BLD AUTO: 0.4 % — SIGNIFICANT CHANGE UP (ref 0–1.5)
KETONES UR-MCNC: NEGATIVE — SIGNIFICANT CHANGE UP
LEUKOCYTE ESTERASE UR-ACNC: NEGATIVE — SIGNIFICANT CHANGE UP
LYMPHOCYTES # BLD AUTO: 1.48 K/UL — SIGNIFICANT CHANGE UP (ref 1–3.3)
LYMPHOCYTES # BLD AUTO: 17.4 % — SIGNIFICANT CHANGE UP (ref 13–44)
MCHC RBC-ENTMCNC: 29.6 PG — SIGNIFICANT CHANGE UP (ref 27–34)
MCHC RBC-ENTMCNC: 31.8 GM/DL — LOW (ref 32–36)
MCV RBC AUTO: 93.1 FL — SIGNIFICANT CHANGE UP (ref 80–100)
MONOCYTES # BLD AUTO: 0.64 K/UL — SIGNIFICANT CHANGE UP (ref 0–0.9)
MONOCYTES NFR BLD AUTO: 7.5 % — SIGNIFICANT CHANGE UP (ref 2–14)
NEUTROPHILS # BLD AUTO: 6.2 K/UL — SIGNIFICANT CHANGE UP (ref 1.8–7.4)
NEUTROPHILS NFR BLD AUTO: 72.5 % — SIGNIFICANT CHANGE UP (ref 43–77)
NITRITE UR-MCNC: NEGATIVE — SIGNIFICANT CHANGE UP
PH UR: 6 — SIGNIFICANT CHANGE UP (ref 5–8)
PLATELET # BLD AUTO: 227 K/UL — SIGNIFICANT CHANGE UP (ref 150–400)
POTASSIUM SERPL-MCNC: 3.9 MMOL/L — SIGNIFICANT CHANGE UP (ref 3.5–5.3)
POTASSIUM SERPL-SCNC: 3.9 MMOL/L — SIGNIFICANT CHANGE UP (ref 3.5–5.3)
PROT SERPL-MCNC: 7.4 G/DL — SIGNIFICANT CHANGE UP (ref 6.6–8.7)
PROT UR-MCNC: 15 MG/DL
RBC # BLD: 4.76 M/UL — SIGNIFICANT CHANGE UP (ref 3.8–5.2)
RBC # FLD: 12.7 % — SIGNIFICANT CHANGE UP (ref 10.3–14.5)
RBC CASTS # UR COMP ASSIST: ABNORMAL /HPF (ref 0–4)
SODIUM SERPL-SCNC: 142 MMOL/L — SIGNIFICANT CHANGE UP (ref 135–145)
SP GR SPEC: 1.02 — SIGNIFICANT CHANGE UP (ref 1.01–1.02)
UROBILINOGEN FLD QL: NEGATIVE MG/DL — SIGNIFICANT CHANGE UP
WBC # BLD: 8.53 K/UL — SIGNIFICANT CHANGE UP (ref 3.8–10.5)
WBC # FLD AUTO: 8.53 K/UL — SIGNIFICANT CHANGE UP (ref 3.8–10.5)
WBC UR QL: SIGNIFICANT CHANGE UP

## 2019-09-13 PROCEDURE — 81025 URINE PREGNANCY TEST: CPT

## 2019-09-13 PROCEDURE — 36415 COLL VENOUS BLD VENIPUNCTURE: CPT

## 2019-09-13 PROCEDURE — 99285 EMERGENCY DEPT VISIT HI MDM: CPT

## 2019-09-13 PROCEDURE — 80053 COMPREHEN METABOLIC PANEL: CPT

## 2019-09-13 PROCEDURE — 96374 THER/PROPH/DIAG INJ IV PUSH: CPT | Mod: XU

## 2019-09-13 PROCEDURE — 81001 URINALYSIS AUTO W/SCOPE: CPT

## 2019-09-13 PROCEDURE — 85027 COMPLETE CBC AUTOMATED: CPT

## 2019-09-13 PROCEDURE — 74177 CT ABD & PELVIS W/CONTRAST: CPT | Mod: 26

## 2019-09-13 PROCEDURE — 99284 EMERGENCY DEPT VISIT MOD MDM: CPT | Mod: 25

## 2019-09-13 PROCEDURE — 74177 CT ABD & PELVIS W/CONTRAST: CPT

## 2019-09-13 PROCEDURE — 96375 TX/PRO/DX INJ NEW DRUG ADDON: CPT

## 2019-09-13 RX ORDER — SODIUM CHLORIDE 9 MG/ML
1000 INJECTION INTRAMUSCULAR; INTRAVENOUS; SUBCUTANEOUS ONCE
Refills: 0 | Status: COMPLETED | OUTPATIENT
Start: 2019-09-13 | End: 2019-09-13

## 2019-09-13 RX ORDER — ONDANSETRON 8 MG/1
4 TABLET, FILM COATED ORAL ONCE
Refills: 0 | Status: COMPLETED | OUTPATIENT
Start: 2019-09-13 | End: 2019-09-13

## 2019-09-13 RX ORDER — KETOROLAC TROMETHAMINE 30 MG/ML
15 SYRINGE (ML) INJECTION ONCE
Refills: 0 | Status: DISCONTINUED | OUTPATIENT
Start: 2019-09-13 | End: 2019-09-13

## 2019-09-13 RX ORDER — OXYCODONE AND ACETAMINOPHEN 5; 325 MG/1; MG/1
1 TABLET ORAL ONCE
Refills: 0 | Status: DISCONTINUED | OUTPATIENT
Start: 2019-09-13 | End: 2019-09-13

## 2019-09-13 RX ORDER — MORPHINE SULFATE 50 MG/1
2 CAPSULE, EXTENDED RELEASE ORAL ONCE
Refills: 0 | Status: DISCONTINUED | OUTPATIENT
Start: 2019-09-13 | End: 2019-09-13

## 2019-09-13 RX ORDER — KETOROLAC TROMETHAMINE 30 MG/ML
30 SYRINGE (ML) INJECTION ONCE
Refills: 0 | Status: DISCONTINUED | OUTPATIENT
Start: 2019-09-13 | End: 2019-09-13

## 2019-09-13 RX ADMIN — MORPHINE SULFATE 2 MILLIGRAM(S): 50 CAPSULE, EXTENDED RELEASE ORAL at 16:30

## 2019-09-13 RX ADMIN — Medication 15 MILLIGRAM(S): at 19:40

## 2019-09-13 RX ADMIN — SODIUM CHLORIDE 1000 MILLILITER(S): 9 INJECTION INTRAMUSCULAR; INTRAVENOUS; SUBCUTANEOUS at 16:31

## 2019-09-13 RX ADMIN — OXYCODONE AND ACETAMINOPHEN 1 TABLET(S): 5; 325 TABLET ORAL at 21:13

## 2019-09-13 RX ADMIN — SODIUM CHLORIDE 1000 MILLILITER(S): 9 INJECTION INTRAMUSCULAR; INTRAVENOUS; SUBCUTANEOUS at 17:31

## 2019-09-13 RX ADMIN — ONDANSETRON 4 MILLIGRAM(S): 8 TABLET, FILM COATED ORAL at 16:32

## 2019-09-13 NOTE — ED STATDOCS - PATIENT PORTAL LINK FT
You can access the FollowMyHealth Patient Portal offered by [facility] by registering at the following website: [email or web address]. By joining [facility]’s FollowMyHealth portal, you will also be able to view your health information using other applications (apps) compatible with our system.

## 2019-09-13 NOTE — ED STATDOCS - OBJECTIVE STATEMENT
42 y/o F pt with significant PMHx of UTI presents to the ED c/o constant L pelvic pain, onset yesterday. The pain is described as being sharp, and radiates through to her L flank. Associated sx of lightheadedness, N/V, Diarrhea (onset 3 days ago; last episode was this morning). Patient denies dysuria, but admits to feeling immense "pressure" when she urinated this morning. The pelvic pain was intermittent last night, but became constant this morning, prompting her to come to the ED. Patient has had reduced PO intake secondary to her vomiting; she is unable to keep food down because she vomits it back up. She has tried taking 800mg of Motrin and other OTC medications with no relief. Patient states that she has had multiple UTI's in the past. NKDA. Non smoker, non drinker. Denies CP, SOB, rash, dysuria, BLE edema. Patient also denies any chance of pregnancy. No further acute complaints at this time.   SHx: Hysterectomy 6 years ago.     SHx of hysterectomy 6 years ago. 44 y/o F pt with significant PMHx of UTI presents to the ED c/o constant L pelvic pain, onset yesterday. The pain is described as being sharp, and radiates through to her L flank. Associated sx of fever( Tmax 101 yesterday), lightheadedness, N/V, Diarrhea (onset 3 days ago; last episode was this morning). Patient denies dysuria, but admits to feeling immense "pressure" when she urinated this morning. The pelvic pain was intermittent last night, but became constant this morning, prompting her to come to the ED. Patient has had reduced PO intake secondary to her vomiting; she is unable to keep food down because she vomits it back up. She has tried taking 800mg of Motrin and other OTC medications with no relief. Patient states that she has had multiple UTI's in the past. NKDA. Non smoker, non drinker. Denies CP, SOB, rash, dysuria, BLE edema. Patient also denies any chance of pregnancy. No further acute complaints at this time.   SHx: Hysterectomy 6 years ago.     SHx of hysterectomy 6 years ago.

## 2019-09-13 NOTE — ED STATDOCS - PSH
No significant past surgical history    No significant past surgical history H/O hysterectomy for benign disease  2013

## 2019-09-13 NOTE — ED STATDOCS - ATTENDING CONTRIBUTION TO CARE
I, Kalani Odell, performed the initial face to face bedside interview with this patient regarding history of present illness, review of symptoms and relevant past medical, social and family history.  I completed an independent physical examination.  I was the initial provider who evaluated this patient. I have signed out the follow up of any pending tests (i.e. labs, radiological studies) to the ACP.  I have communicated the patient’s plan of care and disposition with the ACP.

## 2019-09-13 NOTE — ED ADULT NURSE NOTE - OBJECTIVE STATEMENT
Pt here with c/o L flank pain and lower abd pain that started earlier today.  Pt denies N/V/D, denies fever.

## 2019-09-13 NOTE — ED STATDOCS - CLINICAL SUMMARY MEDICAL DECISION MAKING FREE TEXT BOX
Pt at bedside moderately distressed. Has PMHx of recurrent UTI's. Will obtain labs to rule out UTI vs. Pyelo.

## 2019-09-13 NOTE — ED STATDOCS - CHPI ED SYMPTOM POS
VOMITING/PAIN/L pelvic pain, lightheadedness/NAUSEA/DIARRHEA PAIN/FEVER/NAUSEA/L pelvic pain, lightheadedness/DIARRHEA/VOMITING

## 2019-09-13 NOTE — ED STATDOCS - PROGRESS NOTE DETAILS
NP NOTE:  HPI, ROS, PE of intake doctor reviewed.  Patient with progressive left flank and LLQ pain since Tuesday with fever, N/V/D.  On exam + BS x 4, abd soft, LLQ TTP, left CVA tenderness.  CBC unremarkable, UA with small blood.  Will obtain CT abd/pelvis to r/o diverticulitis, vs pyelo. OSCAR MONTOYA: pt with llq pain and fever and diarrhea. pending ct scan diverticvs stone vs pyelo? pt eating viktor boo. states that she still has intermittent "pinching pain"   asked if comfortable going home and agrees to dc with fu with pmd

## 2020-04-02 NOTE — ED ADULT NURSE NOTE - GASTROINTESTINAL ASSESSMENT
Problem: Pain  Goal: #Acceptable pain level achieved/maintained at rest using NRS/Faces  This goal is used for patients who can self-report.  Acceptable means the level is at or below the identified comfort/function goal.  Outcome: Outcome Met, Continue evaluating goal progress toward completion  Pt reporting feeling more rested this AM. Rested most of shift. Reporting adequate pain control with regimen. Intermittent IV abx maintained. Bed alarm maintained for pt safety.        WDL

## 2021-01-12 NOTE — ED ADULT TRIAGE NOTE - HEIGHT IN INCHES
Head,  normocephalic,  atraumatic,  Face,  Face within normal limits,  Ears,  External ears within normal limits,  Nose/Nasopharynx,  External nose  normal appearance,  nares patent,  no nasal discharge,  Mouth and Throat,  Oral cavity appearance normal,  Breath odor normal,  Lips,  Appearance normal 
4

## 2022-03-06 NOTE — ED PROVIDER NOTE - NS ED MD DISPO DISCHARGE
Patient Education   Table of Contents       Acetaminophen Dosage Chart, Pediatric       Fever, Pediatric       Ibuprofen Dosage Chart, Pediatric     To view videos and all your education online visit,   [email or web address]   or scan this QR code with your smartphone.                  Acetaminophen Dosage Chart, Pediatric     Acetaminophen, also called Tylenol?, is a medicine used to relieve pain and fever in children.   Before giving the medicine   Check the label  on the bottle for the amount and strength (concentration) of acetaminophen. Concentrated infant acetaminophen drops (80 mg per 1 mL) are no longer made or sold in the [place] but they are available in other countries including [place].   Determine the dosage  by finding your child's weight below. The medicine can be given in liquid, chewable tablet, or dissolving powder form. Each type may have a different concentration of medicine.   Measure the dosage.  To measure liquid, use the oral syringe or medicine cup that came with the bottle. Do not  use household teaspoons or spoons.   Do not  give acetaminophen if your child is 12 weeks of age or younger unless instructed to do so by your child's health care provider.   Dosage by weight   Weight: 6?11 lb (2.7?5 kg)        Suspension liquid (160 mg per 5 mL):  1.25 mL.      Chewable tablets (160 mg tablets):  Not recommended.      Dissolving powder in packets (160 mg per powder):  Not recommended.     Weight 12?17 lb (5.4?7.7 kg)        Suspension liquid (160 mg per 5 mL):  2.5 mL.      Chewable tablets (160 mg tablets):  Not recommended.      Dissolving powder in packets (160 mg per powder):  Not recommended.     Weight 18?23 lb (8.2?10.4 kg)        Suspension liquid (160 mg per 5 mL):  3.75 mL.      Chewable tablets (160 mg tablets):  Not recommended.      Dissolving powder in packets (160 mg per powder):  Not recommended.     Weight: 24?35 lb (10.9?15.9 kg)           Suspension liquid (160 mg per 5  mL):  5 mL.      Chewable tablets (160 mg tablets):  1 tablet.      Dissolving powder in packets (160 mg per powder):  Not recommended.     Weight: 36?47 lb (16.3?21.3 kg)           Suspension liquid (160 mg per 5 mL):  7.5 mL.      Chewable tablets (160 mg tablets):  1? tablets.      Dissolving powder in packets (160 mg per powder):  Not recommended.       Weight: 48?59 lb (21.8?26.8 kg)           Suspension liquid (160 mg per 5 mL):  10 mL.      Chewable tablets (160 mg tablets):  2 tablets.      Dissolving powder in packets (160 mg per powder):  2 powders.       Weight: 60?71 lb (27.2?32.2 kg)           Suspension liquid (160 mg per 5 mL):  12.5 mL.      Chewable tablets (160 mg tablets):  2? tablets.      Dissolving powder in packets (160 mg per powder):  2 powders.       Weight: 72?95 lb (32.7?43.1 kg)           Suspension liquid (160 mg per 5 mL):  15 mL.      Chewable tablets (160 mg tablets):  3 tablets.      Dissolving powder in packets (160 mg per powder):  3 powders.       Weight: 96 lb and over (43.6 kg and over)        Suspension liquid (160 mg per 5 mL):  20 mL.      Chewable tablets (160 mg tablets):  4 tablets.      Dissolving powder in packets (160 mg per powder):  Not recommended.       Follow these instructions at home:         Repeat the dosage every 4?6 hours as needed, or as recommended by your child's health care provider. Do not  give more than 5 doses in 24 hours.      Do not  give more than one medicine containing acetaminophen at the same time. Taking too much acetaminophen can lead to significant problems such as liver damage.      Do not  give your child aspirin unless you are told to do so by your child's pediatrician or cardiologist. Aspirin has been linked to a serious medical reaction called Reye's syndrome.     Summary         Acetaminophen is commonly used to relieve pain and fever in children.       Determine the correct dosage for your child based on his or her weight.      Do  not  give more than one medicine containing acetaminophen at the same time.       Repeat the dosage every 4?6 hours as needed, or as recommended by your child's health care provider. Do not  give more than 5 doses in 24 hours.     This information is not intended to replace advice given to you by your health care provider. Make sure you discuss any questions you have with your health care provider.     Document Released: 12/18/2006Document Revised: 01/28/2021Document Reviewed: 2018     [employer] Patient Education ? 2021 [employer]         Fever, Pediatric             A fever is an increase in the body's temperature. A fever often means a temperature of 100.4?F (38?C) or higher. If your child is older than 3 months, a brief mild or moderate fever often has no long-term effect. It often does not need treatment. If your child is younger than 3 months and has a fever, it may mean that there is a serious problem. Sometimes, a high fever in babies and toddlers can lead to a seizure (febrile seizure).    Your child is at risk of losing water in the body (getting dehydrated) because of too much sweating. This can happen with:       Fevers that happen again and again.       Fevers that last a long time.      You can use a thermometer to check if your child has a fever. Temperature can vary with:       Age.       Time of day.      Where in the body you take the temperature. Readings may vary when the thermometer is put:       In the mouth (oral).       In the butt (rectal). This is the most accurate.       In the ear (tympanic).       Under the arm (axillary).       On the forehead (temporal).       Follow these instructions at home:   Medicines         Give over-the-counter and prescription medicines only as told by your child's doctor. Follow the dosing instructions carefully.      Do not  give your child aspirin.       If your child was given an antibiotic medicine, give it only as told by your child's doctor. Do  not  stop giving the antibiotic even if he or she starts to feel better.     If your child has a seizure:         Keep your child safe, but do not  hold your child down during a seizure.       Place your child on his or her side or stomach. This will help to keep your child from choking.       If you can, gently remove any objects from your child's mouth. Do not  place anything in your child's mouth during a seizure.     General instructions         Watch for any changes in your child's symptoms. Tell your child's doctor about them.       Have your child rest as needed.       Have your child drink enough fluid to keep his or her pee (urine) pale yellow.       Sponge or bathe your child with room-temperature water to help reduce body temperature as needed. Do not  use ice water. Also, do not  sponge or bathe your child if doing so makes your child more fussy.      Do not  cover your child in too many blankets or heavy clothes.      If the fever was caused by an infection that spreads from person to person (is contagious), such as a cold or the flu:       Your child should stay home from school, , and other public places until at least 24 hours after the fever is gone. Your child's fever should be gone for at least 24 hours without the need to use medicines.       Your child should leave the home only to get medical care if needed.       Keep all follow-up visits as told by your child's doctor. This is important.     Contact a doctor if:         Your child throws up (vomits).       Your child has watery poop (diarrhea).       Your child has pain when he or she pees.       Your child's symptoms do not get better with treatment.       Your child has new symptoms.     Get help right away if your child:         Who is younger than 3 months has a temperature of 100.4?F (38?C) or higher.       Becomes limp or floppy.       Wheezes or is short of breath.       Is dizzy or passes out (faints).       Will not drink.       Has any of these:       A seizure.       A rash.       A stiff neck.       A very bad headache.       Very bad pain in the belly (abdomen).       A very bad cough.       Keeps throwing up or having watery poop.      Is one year old or younger, and has signs of losing too much water in the body. These may include:       A sunken soft spot (fontanel) on his or her head.       No wet diapers in 6 hours.       More fussiness.      Is one year old or older, and has signs of losing too much water in the body. These may include:       No pee in 8?12 hours.       Cracked lips.       Not making tears while crying.       Sunken eyes.       Sleepiness.       Weakness.     Summary         A fever is an increase in the body's temperature. It is defined as a temperature of 100.4?F (38?C) or higher.       Watch for any changes in your child's symptoms. Tell your child's doctor about them.       Give all medicines only as told by your child's doctor.      Do not  let your child go to school, , or other public places if the fever was caused by an illness that can spread to other people.       Get help right away if your child has signs of losing too much water in the body.     This information is not intended to replace advice given to you by your health care provider. Make sure you discuss any questions you have with your health care provider.     Document Released: 10/15/2010Document Revised: 06/05/2019Document Reviewed: 06/05/2019     [employer] Patient Education ? 2021 [employer]         Ibuprofen Dosage Chart, Pediatric     Ibuprofen, also called Motrin? or Advil?, is a medicine used to relieve pain and fever in children.   Before giving the medicine   Check the label  on the bottle for the amount and strength (concentration) of ibuprofen.   Determine the dosage  by finding your child's weight below. The medicine can be given in liquid, chewable tablet, or standard tablet form. Each type may have a different  concentration of medicine.   Measure the dosage.  To measure liquid, use the oral syringe or medicine cup that came with the bottle. Do not  use household teaspoons or spoons.   Do not  give ibuprofen if your child is 6 months of age or younger unless instructed to do so by your child's health care provider.   Dosage by weight   Weight: 12?17 lb (5.4?7.7 kg)        Infant concentrated drops (50 mg in 1.25 mL):  1.25 mL.      Children's suspension liquid (100 mg in 5 mL):  2.5 mL.      Children's or constance-strength tablets or chewable tablets (100 mg tablets):  Not recommended.     Weight: 18?23 lb (8.2?10.4 kg)        Infant concentrated drops (50 mg in 1.25 mL):  1.875 mL.      Children's suspension liquid (100 mg in 5 mL):  4 mL.      Children's or constance-strength tablets or chewable tablets (100 mg tablets):  Not recommended.     Weight: 24?35 lb (10.9?15.9 kg)           Infant concentrated drops (50 mg in 1.25 mL):  2.5 mL.      Children's suspension liquid (100 mg in 5 mL):  5 mL.      Children's or constance-strength tablets or chewable tablets (100 mg tablets):  Not recommended.     Weight: 36?47 lb (16.3?21.3 kg)           Infant concentrated drops (50 mg in 1.25 mL):  3.75 mL.      Children's suspension liquid (100 mg in 5 mL):  7.5 mL.      Children's or constance-strength tablets or chewable tablets (100 mg tablets):  Not recommended.       Weight: 48?59 lb (21.8?26.8 kg)           Infant concentrated drops (50 mg in 1.25 mL):  5 mL.      Children's suspension liquid (100 mg in 5 mL):  10 mL.      Children's or constance-strength tablets or chewable tablets (100 mg tablets):  2 tablets.       Weight: 60?71 lb (27.2?32.2 kg)           Infant concentrated drops (50 mg in 1.25 mL):  Not recommended.      Children's suspension liquid (100 mg in 5 mL):  12.5 mL.      Children's or constance-strength tablets or chewable tablets (100 mg tablets):  2? tablets.       Weight: 72?95 lb (32.7?43.1 kg)           Infant  concentrated drops (50 mg in 1.25 mL):  Not recommended.      Children's suspension liquid (100 mg in 5 mL):  15 mL.      Children's or constance-strength tablets or chewable tablets (100 mg tablets):  3 tablets.       Weight: 96 lb and over (43.5 kg and over)        Infant concentrated drops (50 mg in 1.25 mL):  Not recommended.      Children's suspension liquid (100 mg in 5 mL):  20 mL.      Children's or constance-strength tablets or chewable tablets (100 mg tablets):  4 tablets.       Follow these instructions at home:         Repeat dosage every 6?8 hours as needed, or as recommended by your child's health care provider. Do not  give more than 4 doses in 24 hours.      Do not  give your child aspirin unless you are told to do so by your child's pediatrician or cardiologist. Aspirin has been linked to a serious medical reaction called Reye's syndrome.     Summary         Ibuprofen is a medicine used to relieve pain and fever in children.       Determine the correct dosage for your child based on his or her weight.       Repeat dosage every 6?8 hours as needed, or as recommended by your child's health care provider. Do not  give more than 4 doses in 24 hours.     This information is not intended to replace advice given to you by your health care provider. Make sure you discuss any questions you have with your health care provider.     Document Released: 12/18/2006Document Revised: 02/19/2021Document Reviewed: 2018     [employer] Patient Education ? 2021 [employer]        Home

## 2022-07-29 NOTE — ED ADULT NURSE NOTE - CAS DISCH CONDITION
I have seen the patient and examined the patient. There have been no changes to the history since last seen.   
lmor regarding falguni  
Stable
